# Patient Record
Sex: FEMALE | Race: WHITE
[De-identification: names, ages, dates, MRNs, and addresses within clinical notes are randomized per-mention and may not be internally consistent; named-entity substitution may affect disease eponyms.]

---

## 2018-12-28 ENCOUNTER — HOSPITAL ENCOUNTER (EMERGENCY)
Dept: HOSPITAL 4 - SED | Age: 58
Discharge: HOME | End: 2018-12-28
Payer: MEDICAID

## 2018-12-28 VITALS — WEIGHT: 180 LBS | HEIGHT: 61 IN | BODY MASS INDEX: 33.99 KG/M2

## 2018-12-28 VITALS — SYSTOLIC BLOOD PRESSURE: 144 MMHG

## 2018-12-28 VITALS — SYSTOLIC BLOOD PRESSURE: 125 MMHG

## 2018-12-28 DIAGNOSIS — R03.0: ICD-10-CM

## 2018-12-28 DIAGNOSIS — Z88.8: ICD-10-CM

## 2018-12-28 DIAGNOSIS — R68.84: Primary | ICD-10-CM

## 2018-12-28 LAB
ALBUMIN SERPL BCP-MCNC: 3.8 G/DL (ref 3.4–4.8)
ALT SERPL W P-5'-P-CCNC: 31 U/L (ref 12–78)
ANION GAP SERPL CALCULATED.3IONS-SCNC: 9 MMOL/L (ref 5–15)
AST SERPL W P-5'-P-CCNC: 19 U/L (ref 10–37)
BASOPHILS # BLD AUTO: 0 K/UL (ref 0–0.2)
BASOPHILS NFR BLD AUTO: 0.5 % (ref 0–2)
BILIRUB SERPL-MCNC: 0.3 MG/DL (ref 0–1)
BUN SERPL-MCNC: 14 MG/DL (ref 8–21)
CALCIUM SERPL-MCNC: 9.6 MG/DL (ref 8.4–11)
CHLORIDE SERPL-SCNC: 102 MMOL/L (ref 98–107)
CREAT SERPL-MCNC: 0.92 MG/DL (ref 0.55–1.3)
EOSINOPHIL # BLD AUTO: 0.1 K/UL (ref 0–0.4)
EOSINOPHIL NFR BLD AUTO: 0.9 % (ref 0–4)
ERYTHROCYTE [DISTWIDTH] IN BLOOD BY AUTOMATED COUNT: 12.6 % (ref 9–15)
GFR SERPL CREATININE-BSD FRML MDRD: 81 ML/MIN (ref 90–?)
GLUCOSE SERPL-MCNC: 99 MG/DL (ref 70–99)
HCT VFR BLD AUTO: 44.2 % (ref 36–48)
HGB BLD-MCNC: 14.6 G/DL (ref 12–16)
INR PPP: 0.9 (ref 0.8–1.2)
LYMPHOCYTES # BLD AUTO: 2.5 K/UL (ref 1–5.5)
LYMPHOCYTES NFR BLD AUTO: 34.2 % (ref 20.5–51.5)
MCH RBC QN AUTO: 30 PG (ref 27–31)
MCHC RBC AUTO-ENTMCNC: 33 % (ref 32–36)
MCV RBC AUTO: 90 FL (ref 79–98)
MONOCYTES # BLD MANUAL: 0.4 K/UL (ref 0–1)
MONOCYTES # BLD MANUAL: 5.6 % (ref 1.7–9.3)
NEUTROPHILS # BLD AUTO: 4.4 K/UL (ref 1.8–7.7)
NEUTROPHILS NFR BLD AUTO: 58.8 % (ref 40–70)
PLATELET # BLD AUTO: 239 K/UL (ref 130–430)
POTASSIUM SERPL-SCNC: 4.4 MMOL/L (ref 3.5–5.1)
PROTHROMBIN TIME: 9.4 SECS (ref 9.5–12.5)
RBC # BLD AUTO: 4.9 MIL/UL (ref 4.2–6.2)
SODIUM SERPLBLD-SCNC: 142 MMOL/L (ref 136–145)
WBC # BLD AUTO: 7.4 K/UL (ref 4.8–10.8)

## 2018-12-28 NOTE — NUR
Placed in room 7. Placed on cardiac monitor, blood pressure machine and pulse 
oximeter. To gown for exam. Side rails up. Report given to Amadou TREVIÑO.

## 2018-12-28 NOTE — NUR
PATIENT SITTING UP ON BED. AAOX4. RESPIRATIONS EVEN AN DUNLABORED. NO SOB AT 
THIS TIME. SPEAKING FULL SENTENCES. PT WITH C/O ACHING JAW PAIN WHICH RADIATES 
TO THE LEFT SIDE OF CHEST AND LEFT ARM SINCE 0830AM TODAY. PAIN = 4/10 AT THIS 
TIME; TOLERABLE AS VERBALIZED. NO OBJECTIVE S/SX OF PAIN OBSERVED. REST, 
RELAXATION, AND DEEP BREATHING ENCOURAGED. WILL CONTINUE TO MONITOR. AWAITING 
LAB RESULTS.

## 2018-12-28 NOTE — NUR
PATIENT RESTING COMFORTABLY ON BED. PT IN NO ACUTE DISTRESS AND IN GOOD 
CONDITION. PT VERBALIZED RELIEF FROM PAIN. PAIN = 0/10. WILL CONTINUE TO 
MONITOR.

## 2018-12-28 NOTE — NUR
Patient given written and verbal discharge instructions and verbalizes 
understanding.  ER MD discussed with patient the results and treatment 
provided. Patient in stable condition. ID arm band removed. 

No Rx given. Patient educated on pain management and to follow up with PMD. 
Pain Scale 0/10.

Opportunity for questions provided and answered. Medication side effect fact 
sheet provided. 



PATIENT VERBALIZED RELIEF FROM JAW, LEFT SIDE OF CHEST, AND LEFT ARM PAIN. 
PATIENT IN NO ACUTE DISTRESS AND IN GOOD CONDITION. NOTED WITH A STEADY GAIT.

## 2021-03-11 ENCOUNTER — HOSPITAL ENCOUNTER (EMERGENCY)
Dept: HOSPITAL 4 - SED | Age: 61
LOS: 1 days | Discharge: HOME | End: 2021-03-12
Payer: MEDICAID

## 2021-03-11 VITALS — WEIGHT: 180 LBS | BODY MASS INDEX: 28.93 KG/M2 | HEIGHT: 66 IN

## 2021-03-11 VITALS — SYSTOLIC BLOOD PRESSURE: 141 MMHG

## 2021-03-11 DIAGNOSIS — T65.891A: Primary | ICD-10-CM

## 2021-03-11 DIAGNOSIS — Z88.8: ICD-10-CM
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
## 2021-03-11 DIAGNOSIS — D72.829: ICD-10-CM

## 2021-03-11 LAB
ANION GAP SERPL CALCULATED.3IONS-SCNC: 7 MMOL/L (ref 5–15)
BASOPHILS # BLD AUTO: 0.1 K/UL (ref 0–0.2)
BASOPHILS NFR BLD AUTO: 0.4 % (ref 0–2)
BUN SERPL-MCNC: 16 MG/DL (ref 8–21)
CALCIUM SERPL-MCNC: 8.8 MG/DL (ref 8.4–11)
CHLORIDE SERPL-SCNC: 103 MMOL/L (ref 98–107)
CREAT SERPL-MCNC: 0.99 MG/DL (ref 0.55–1.3)
EOSINOPHIL # BLD AUTO: 0.1 K/UL (ref 0–0.4)
EOSINOPHIL NFR BLD AUTO: 0.3 % (ref 0–4)
ERYTHROCYTE [DISTWIDTH] IN BLOOD BY AUTOMATED COUNT: 13.2 % (ref 9–15)
GFR SERPL CREATININE-BSD FRML MDRD: 74 ML/MIN (ref 90–?)
GLUCOSE SERPL-MCNC: 110 MG/DL (ref 70–99)
HCT VFR BLD AUTO: 41.6 % (ref 36–48)
HGB BLD-MCNC: 13.7 G/DL (ref 12–16)
LYMPHOCYTES # BLD AUTO: 2.2 K/UL (ref 1–5.5)
LYMPHOCYTES NFR BLD AUTO: 15.1 % (ref 20.5–51.5)
MCH RBC QN AUTO: 30 PG (ref 27–31)
MCHC RBC AUTO-ENTMCNC: 33 % (ref 32–36)
MCV RBC AUTO: 90 FL (ref 79–98)
MONOCYTES # BLD MANUAL: 0.7 K/UL (ref 0–1)
MONOCYTES # BLD MANUAL: 4.9 % (ref 1.7–9.3)
NEUTROPHILS # BLD AUTO: 11.5 K/UL (ref 1.8–7.7)
NEUTROPHILS NFR BLD AUTO: 79.3 % (ref 40–70)
PLATELET # BLD AUTO: 208 K/UL (ref 130–430)
POTASSIUM SERPL-SCNC: 4.1 MMOL/L (ref 3.5–5.1)
RBC # BLD AUTO: 4.61 MIL/UL (ref 4.2–6.2)
SODIUM SERPLBLD-SCNC: 139 MMOL/L (ref 136–145)
WBC # BLD AUTO: 14.5 K/UL (ref 4.8–10.8)

## 2021-03-11 PROCEDURE — 96361 HYDRATE IV INFUSION ADD-ON: CPT

## 2021-03-11 PROCEDURE — 80048 BASIC METABOLIC PNL TOTAL CA: CPT

## 2021-03-11 PROCEDURE — 81003 URINALYSIS AUTO W/O SCOPE: CPT

## 2021-03-11 PROCEDURE — 96375 TX/PRO/DX INJ NEW DRUG ADDON: CPT

## 2021-03-11 PROCEDURE — 70450 CT HEAD/BRAIN W/O DYE: CPT

## 2021-03-11 PROCEDURE — 99284 EMERGENCY DEPT VISIT MOD MDM: CPT

## 2021-03-11 PROCEDURE — 85025 COMPLETE CBC W/AUTO DIFF WBC: CPT

## 2021-03-11 PROCEDURE — 82803 BLOOD GASES ANY COMBINATION: CPT

## 2021-03-11 PROCEDURE — 96374 THER/PROPH/DIAG INJ IV PUSH: CPT

## 2021-03-11 PROCEDURE — 36600 WITHDRAWAL OF ARTERIAL BLOOD: CPT

## 2021-03-11 PROCEDURE — 36415 COLL VENOUS BLD VENIPUNCTURE: CPT

## 2021-03-11 PROCEDURE — 76376 3D RENDER W/INTRP POSTPROCES: CPT

## 2021-03-11 NOTE — NUR
Pt resting comfortably in bed, respirations even and unlabored, pt states she 
is feeling much better.

## 2021-03-11 NOTE — NUR
Placed in room 07  . Placed on cardiac monitor, blood pressure machine and 
pulse oximeter. To gown for exam. Side rails up.

Report given to ER RN LAURA BLISS/ELBA WITT.

## 2021-03-11 NOTE — NUR
Pt walked into ED with daughter () c/o HA, vomiting that started 2 
hours PTA while at work. 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 
shaky, and vomited. Of note, the patients daughter states the patient recently 
received the second dose of COVID 19 vaccine. The patient has a hx of migraines 
and states this feels like a migraine episode, with new onset of chills and 
shakiness.

## 2021-03-12 VITALS — SYSTOLIC BLOOD PRESSURE: 137 MMHG

## 2021-03-12 LAB
APPEARANCE UR: (no result)
BILIRUB UR QL STRIP: NEGATIVE
COLOR UR: YELLOW
GLUCOSE UR STRIP-MCNC: NEGATIVE MG/DL
HGB UR QL STRIP: NEGATIVE
KETONES UR STRIP-MCNC: (no result) MG/DL
LEUKOCYTE ESTERASE UR QL STRIP: NEGATIVE
NITRITE UR QL STRIP: NEGATIVE
PH UR STRIP: 8.5 [PH] (ref 5–8)
PROT UR QL STRIP: NEGATIVE
SP GR UR STRIP: 1.01 (ref 1–1.03)
UROBILINOGEN UR STRIP-MCNC: 0.2 MG/DL (ref 0.2–1)

## 2021-03-12 NOTE — NUR
Patient given written and verbal discharge instructions and verbalizes 
understanding.  ER MD discussed with patient the results and treatment 
provided. Patient in stable condition. ID arm band removed. IV catheter removed 
intact and dressing applied, no active bleeding. Opportunity for questions 
provided and answered.

## 2021-07-16 ENCOUNTER — HOSPITAL ENCOUNTER (EMERGENCY)
Dept: HOSPITAL 4 - SED | Age: 61
LOS: 1 days | Discharge: HOME | End: 2021-07-17
Payer: MEDICAID

## 2021-07-16 VITALS — BODY MASS INDEX: 31.89 KG/M2 | WEIGHT: 180 LBS | HEIGHT: 63 IN

## 2021-07-16 VITALS — SYSTOLIC BLOOD PRESSURE: 150 MMHG

## 2021-07-16 DIAGNOSIS — K57.92: Primary | ICD-10-CM

## 2021-07-16 DIAGNOSIS — Z79.899: ICD-10-CM

## 2021-07-16 DIAGNOSIS — Z88.8: ICD-10-CM

## 2021-07-16 LAB
ALBUMIN SERPL BCP-MCNC: 3.5 G/DL (ref 3.4–4.8)
ALT SERPL W P-5'-P-CCNC: 35 U/L (ref 12–78)
ANION GAP SERPL CALCULATED.3IONS-SCNC: 12 MMOL/L (ref 5–15)
AST SERPL W P-5'-P-CCNC: 20 U/L (ref 10–37)
BASOPHILS # BLD AUTO: 0.1 K/UL (ref 0–0.2)
BASOPHILS NFR BLD AUTO: 1 % (ref 0–2)
BILIRUB SERPL-MCNC: 0.2 MG/DL (ref 0–1)
BUN SERPL-MCNC: 22 MG/DL (ref 8–21)
CALCIUM SERPL-MCNC: 8.4 MG/DL (ref 8.4–11)
CHLORIDE SERPL-SCNC: 105 MMOL/L (ref 98–107)
CREAT SERPL-MCNC: 1.15 MG/DL (ref 0.55–1.3)
EOSINOPHIL # BLD AUTO: 0.2 K/UL (ref 0–0.4)
EOSINOPHIL NFR BLD AUTO: 1.9 % (ref 0–4)
ERYTHROCYTE [DISTWIDTH] IN BLOOD BY AUTOMATED COUNT: 13.1 % (ref 9–15)
GFR SERPL CREATININE-BSD FRML MDRD: 62 ML/MIN (ref 90–?)
GLUCOSE SERPL-MCNC: 106 MG/DL (ref 70–99)
HCT VFR BLD AUTO: 41.4 % (ref 36–48)
HGB BLD-MCNC: 13.9 G/DL (ref 12–16)
LIPASE SERPL-CCNC: 132 U/L (ref 73–393)
LYMPHOCYTES # BLD AUTO: 3.1 K/UL (ref 1–5.5)
LYMPHOCYTES NFR BLD AUTO: 38.7 % (ref 20.5–51.5)
MCH RBC QN AUTO: 30 PG (ref 27–31)
MCHC RBC AUTO-ENTMCNC: 34 % (ref 32–36)
MCV RBC AUTO: 91 FL (ref 79–98)
MONOCYTES # BLD MANUAL: 0.6 K/UL (ref 0–1)
MONOCYTES # BLD MANUAL: 7.6 % (ref 1.7–9.3)
NEUTROPHILS # BLD AUTO: 4.1 K/UL (ref 1.8–7.7)
NEUTROPHILS NFR BLD AUTO: 50.8 % (ref 40–70)
PLATELET # BLD AUTO: 245 K/UL (ref 130–430)
POTASSIUM SERPL-SCNC: 4.1 MMOL/L (ref 3.5–5.1)
RBC # BLD AUTO: 4.57 MIL/UL (ref 4.2–6.2)
SODIUM SERPLBLD-SCNC: 141 MMOL/L (ref 136–145)
WBC # BLD AUTO: 8.1 K/UL (ref 4.8–10.8)

## 2021-07-17 VITALS — SYSTOLIC BLOOD PRESSURE: 144 MMHG

## 2021-07-19 ENCOUNTER — HOSPITAL ENCOUNTER (EMERGENCY)
Dept: HOSPITAL 4 - SED | Age: 61
Discharge: LEFT BEFORE DISCHARGE | End: 2021-07-19
Payer: MEDICAID

## 2021-07-19 VITALS — HEIGHT: 63 IN | BODY MASS INDEX: 31.54 KG/M2 | WEIGHT: 178 LBS

## 2021-07-19 VITALS — SYSTOLIC BLOOD PRESSURE: 126 MMHG

## 2021-07-19 DIAGNOSIS — R10.9: Primary | ICD-10-CM

## 2021-07-19 DIAGNOSIS — Z53.21: ICD-10-CM

## 2022-09-25 ENCOUNTER — HOSPITAL ENCOUNTER (EMERGENCY)
Dept: HOSPITAL 4 - SED | Age: 62
Discharge: HOME | End: 2022-09-25
Payer: MEDICAID

## 2022-09-25 VITALS — BODY MASS INDEX: 31.89 KG/M2 | WEIGHT: 180 LBS | HEIGHT: 63 IN

## 2022-09-25 VITALS — SYSTOLIC BLOOD PRESSURE: 131 MMHG

## 2022-09-25 DIAGNOSIS — B34.9: Primary | ICD-10-CM

## 2022-09-25 DIAGNOSIS — R05.9: ICD-10-CM

## 2022-09-25 DIAGNOSIS — R50.9: ICD-10-CM

## 2022-09-25 DIAGNOSIS — R51.9: ICD-10-CM

## 2022-09-25 DIAGNOSIS — Z79.899: ICD-10-CM

## 2022-09-25 DIAGNOSIS — Z20.822: ICD-10-CM

## 2022-09-25 NOTE — NUR
Patient given written and verbal discharge instructions and verbalizes 
understanding.  ER MD discussed with patient the results and treatment 
provided. Patient in stable condition. ID arm band removed. 

Rx of Albuterol   given. Patient educated on pain management and to follow up 
with PMD. Pain Scale 2/10

Opportunity for questions provided and answered. Medication side effect fact 
sheet provided.

## 2022-09-25 NOTE — NUR
Pt brought by ,  A&Ox4, pt presents to ER with cough /congestion, O2 
97%, afebrile, skin pink and warm , cap refill <3, VSS

## 2023-07-04 ENCOUNTER — HOSPITAL ENCOUNTER (EMERGENCY)
Dept: HOSPITAL 4 - SED | Age: 63
Discharge: HOME | End: 2023-07-04
Payer: MEDICAID

## 2023-07-04 VITALS
HEART RATE: 108 BPM | SYSTOLIC BLOOD PRESSURE: 163 MMHG | RESPIRATION RATE: 18 BRPM | OXYGEN SATURATION: 95 % | TEMPERATURE: 98.3 F

## 2023-07-04 VITALS
RESPIRATION RATE: 18 BRPM | TEMPERATURE: 98.3 F | OXYGEN SATURATION: 98 % | SYSTOLIC BLOOD PRESSURE: 163 MMHG | HEART RATE: 108 BPM

## 2023-07-04 VITALS — HEIGHT: 62 IN | BODY MASS INDEX: 34.41 KG/M2 | WEIGHT: 187 LBS

## 2023-07-04 DIAGNOSIS — Z88.8: ICD-10-CM

## 2023-07-04 DIAGNOSIS — R05.9: ICD-10-CM

## 2023-07-04 DIAGNOSIS — J06.9: Primary | ICD-10-CM

## 2023-07-04 DIAGNOSIS — Z79.899: ICD-10-CM

## 2023-07-04 DIAGNOSIS — J04.0: ICD-10-CM

## 2023-07-04 DIAGNOSIS — Z20.822: ICD-10-CM

## 2023-07-04 PROCEDURE — 99285 EMERGENCY DEPT VISIT HI MDM: CPT

## 2023-07-04 PROCEDURE — 87804 INFLUENZA ASSAY W/OPTIC: CPT

## 2023-07-04 PROCEDURE — 71045 X-RAY EXAM CHEST 1 VIEW: CPT

## 2023-07-04 PROCEDURE — 96372 THER/PROPH/DIAG INJ SC/IM: CPT

## 2023-07-04 PROCEDURE — 36415 COLL VENOUS BLD VENIPUNCTURE: CPT

## 2023-07-04 PROCEDURE — 94644 CONT INHLJ TX 1ST HOUR: CPT

## 2023-07-04 PROCEDURE — 93005 ELECTROCARDIOGRAM TRACING: CPT

## 2023-07-04 PROCEDURE — 87426 SARSCOV CORONAVIRUS AG IA: CPT

## 2023-07-04 NOTE — NUR
BIB [facility] FOR COUGH X3DAYS. PT AFEBRILE UPON CHECKING 97F, A/OX4, DENIES 
SOB AND CHESTPAIN. ATTACHED TO MONITOR FOR CONTINUED MONITORING.

## 2023-07-04 NOTE — NUR
Patient given written and verbal discharge instructions and verbalizes 
understanding.  ER MD discussed with patient the results and treatment 
provided. Patient in stable condition. ID arm band removed. IV catheter removed 
intact and dressing applied, no active bleeding.

Rx of IBUPROFEN PREDNISONE AND BENZONATATE given. Patient educated on pain 
management and to follow up with PMD.

Opportunity for questions provided and answered. Medication side effect fact 
sheet provided.